# Patient Record
Sex: MALE | Race: OTHER | Employment: UNEMPLOYED | URBAN - METROPOLITAN AREA
[De-identification: names, ages, dates, MRNs, and addresses within clinical notes are randomized per-mention and may not be internally consistent; named-entity substitution may affect disease eponyms.]

---

## 2019-09-16 ENCOUNTER — HOSPITAL ENCOUNTER (INPATIENT)
Age: 54
LOS: 5 days | Discharge: HOME | DRG: 885 | End: 2019-09-22
Attending: PSYCHIATRY & NEUROLOGY | Admitting: PSYCHIATRY & NEUROLOGY
Payer: MEDICARE

## 2019-09-16 DIAGNOSIS — F25.0 SCHIZOAFFECTIVE DISORDER, BIPOLAR TYPE (HCC): Primary | ICD-10-CM

## 2019-09-16 LAB
ACETAMINOPHEN LEVEL: <5 UG/ML (ref 10–30)
ALBUMIN SERPL-MCNC: 3.9 G/DL (ref 3.5–4.6)
ALP BLD-CCNC: 143 U/L (ref 35–104)
ALT SERPL-CCNC: 16 U/L (ref 0–41)
AMPHETAMINE SCREEN, URINE: NORMAL
ANION GAP SERPL CALCULATED.3IONS-SCNC: 13 MEQ/L (ref 9–15)
AST SERPL-CCNC: 11 U/L (ref 0–40)
BARBITURATE SCREEN URINE: NORMAL
BASOPHILS ABSOLUTE: 0.2 K/UL (ref 0–0.2)
BASOPHILS RELATIVE PERCENT: 1.2 %
BENZODIAZEPINE SCREEN, URINE: NORMAL
BILIRUB SERPL-MCNC: 0.9 MG/DL (ref 0.2–0.7)
BILIRUBIN URINE: NEGATIVE
BLOOD, URINE: NEGATIVE
BUN BLDV-MCNC: 7 MG/DL (ref 6–20)
CALCIUM SERPL-MCNC: 9 MG/DL (ref 8.5–9.9)
CANNABINOID SCREEN URINE: NORMAL
CHLORIDE BLD-SCNC: 96 MEQ/L (ref 95–107)
CHOLESTEROL, TOTAL: 106 MG/DL (ref 0–199)
CLARITY: CLEAR
CO2: 26 MEQ/L (ref 20–31)
COCAINE METABOLITE SCREEN URINE: NORMAL
COLOR: YELLOW
CREAT SERPL-MCNC: 0.75 MG/DL (ref 0.7–1.2)
EKG ATRIAL RATE: 81 BPM
EKG P AXIS: 32 DEGREES
EKG P-R INTERVAL: 154 MS
EKG Q-T INTERVAL: 366 MS
EKG QRS DURATION: 86 MS
EKG QTC CALCULATION (BAZETT): 425 MS
EKG R AXIS: 26 DEGREES
EKG T AXIS: 24 DEGREES
EKG VENTRICULAR RATE: 81 BPM
EOSINOPHILS ABSOLUTE: 0.4 K/UL (ref 0–0.7)
EOSINOPHILS RELATIVE PERCENT: 2.7 %
ETHANOL PERCENT: NORMAL G/DL
ETHANOL: <10 MG/DL (ref 0–0.08)
GFR AFRICAN AMERICAN: >60
GFR NON-AFRICAN AMERICAN: >60
GLOBULIN: 4.6 G/DL (ref 2.3–3.5)
GLUCOSE BLD-MCNC: 106 MG/DL (ref 70–99)
GLUCOSE URINE: NEGATIVE MG/DL
HCT VFR BLD CALC: 47 % (ref 42–52)
HDLC SERPL-MCNC: 27 MG/DL (ref 40–59)
HEMOGLOBIN: 16.3 G/DL (ref 14–18)
KETONES, URINE: NEGATIVE MG/DL
LDL CHOLESTEROL CALCULATED: 52 MG/DL (ref 0–129)
LEUKOCYTE ESTERASE, URINE: NEGATIVE
LYMPHOCYTES ABSOLUTE: 2.5 K/UL (ref 1–4.8)
LYMPHOCYTES RELATIVE PERCENT: 18.2 %
Lab: NORMAL
MCH RBC QN AUTO: 30.4 PG (ref 27–31.3)
MCHC RBC AUTO-ENTMCNC: 34.7 % (ref 33–37)
MCV RBC AUTO: 87.6 FL (ref 80–100)
METHADONE SCREEN, URINE: NORMAL
MONOCYTES ABSOLUTE: 0.9 K/UL (ref 0.2–0.8)
MONOCYTES RELATIVE PERCENT: 6.8 %
NEUTROPHILS ABSOLUTE: 9.8 K/UL (ref 1.4–6.5)
NEUTROPHILS RELATIVE PERCENT: 71.1 %
NITRITE, URINE: NEGATIVE
OPIATE SCREEN URINE: NORMAL
OXYCODONE URINE: NORMAL
PDW BLD-RTO: 14.7 % (ref 11.5–14.5)
PH UA: 6 (ref 5–9)
PHENCYCLIDINE SCREEN URINE: NORMAL
PLATELET # BLD: 150 K/UL (ref 130–400)
POTASSIUM SERPL-SCNC: 3.5 MEQ/L (ref 3.4–4.9)
PROPOXYPHENE SCREEN: NORMAL
PROTEIN UA: NEGATIVE MG/DL
RBC # BLD: 5.36 M/UL (ref 4.7–6.1)
SALICYLATE, SERUM: <0.3 MG/DL (ref 15–30)
SODIUM BLD-SCNC: 135 MEQ/L (ref 135–144)
SPECIFIC GRAVITY UA: 1.01 (ref 1–1.03)
TOTAL CK: 63 U/L (ref 0–190)
TOTAL PROTEIN: 8.5 G/DL (ref 6.3–8)
TRIGL SERPL-MCNC: 136 MG/DL (ref 0–150)
TSH SERPL DL<=0.05 MIU/L-ACNC: 4.27 UIU/ML (ref 0.44–3.86)
URINE REFLEX TO CULTURE: NORMAL
UROBILINOGEN, URINE: 0.2 E.U./DL
WBC # BLD: 13.7 K/UL (ref 4.8–10.8)

## 2019-09-16 PROCEDURE — G0480 DRUG TEST DEF 1-7 CLASSES: HCPCS

## 2019-09-16 PROCEDURE — 80061 LIPID PANEL: CPT

## 2019-09-16 PROCEDURE — 36415 COLL VENOUS BLD VENIPUNCTURE: CPT

## 2019-09-16 PROCEDURE — 93005 ELECTROCARDIOGRAM TRACING: CPT | Performed by: PSYCHIATRY & NEUROLOGY

## 2019-09-16 PROCEDURE — 80053 COMPREHEN METABOLIC PANEL: CPT

## 2019-09-16 PROCEDURE — 82550 ASSAY OF CK (CPK): CPT

## 2019-09-16 PROCEDURE — 81003 URINALYSIS AUTO W/O SCOPE: CPT

## 2019-09-16 PROCEDURE — 85025 COMPLETE CBC W/AUTO DIFF WBC: CPT

## 2019-09-16 PROCEDURE — 99285 EMERGENCY DEPT VISIT HI MDM: CPT

## 2019-09-16 PROCEDURE — 6370000000 HC RX 637 (ALT 250 FOR IP): Performed by: NURSE PRACTITIONER

## 2019-09-16 PROCEDURE — 84443 ASSAY THYROID STIM HORMONE: CPT

## 2019-09-16 PROCEDURE — 80307 DRUG TEST PRSMV CHEM ANLYZR: CPT

## 2019-09-16 RX ORDER — NICOTINE 21 MG/24HR
1 PATCH, TRANSDERMAL 24 HOURS TRANSDERMAL ONCE
Status: DISCONTINUED | OUTPATIENT
Start: 2019-09-16 | End: 2019-09-17 | Stop reason: SDUPTHER

## 2019-09-16 RX ORDER — PAROXETINE HYDROCHLORIDE 20 MG/1
20 TABLET, FILM COATED ORAL EVERY MORNING
Status: ON HOLD | COMMUNITY
End: 2019-09-22 | Stop reason: HOSPADM

## 2019-09-16 SDOH — HEALTH STABILITY: MENTAL HEALTH: HOW OFTEN DO YOU HAVE A DRINK CONTAINING ALCOHOL?: NEVER

## 2019-09-16 ASSESSMENT — ENCOUNTER SYMPTOMS
WHEEZING: 0
ABDOMINAL PAIN: 0
COUGH: 0
SORE THROAT: 0
SHORTNESS OF BREATH: 0
BACK PAIN: 0
COLOR CHANGE: 0
ABDOMINAL DISTENTION: 0
NAUSEA: 0
TROUBLE SWALLOWING: 0
CONSTIPATION: 0
VOMITING: 0
DIARRHEA: 0
RHINORRHEA: 0
CHEST TIGHTNESS: 0

## 2019-09-17 PROCEDURE — 1240000000 HC EMOTIONAL WELLNESS R&B

## 2019-09-17 PROCEDURE — 6370000000 HC RX 637 (ALT 250 FOR IP): Performed by: PSYCHIATRY & NEUROLOGY

## 2019-09-17 PROCEDURE — 99223 1ST HOSP IP/OBS HIGH 75: CPT | Performed by: PSYCHIATRY & NEUROLOGY

## 2019-09-17 PROCEDURE — 6370000000 HC RX 637 (ALT 250 FOR IP): Performed by: NURSE PRACTITIONER

## 2019-09-17 RX ORDER — TRAZODONE HYDROCHLORIDE 50 MG/1
50 TABLET ORAL NIGHTLY PRN
Status: DISCONTINUED | OUTPATIENT
Start: 2019-09-17 | End: 2019-09-19

## 2019-09-17 RX ORDER — HYDROXYZINE HYDROCHLORIDE 50 MG/ML
50 INJECTION, SOLUTION INTRAMUSCULAR EVERY 6 HOURS PRN
Status: DISCONTINUED | OUTPATIENT
Start: 2019-09-17 | End: 2019-09-22 | Stop reason: HOSPADM

## 2019-09-17 RX ORDER — ACETAMINOPHEN 325 MG/1
650 TABLET ORAL EVERY 4 HOURS PRN
Status: DISCONTINUED | OUTPATIENT
Start: 2019-09-17 | End: 2019-09-22 | Stop reason: HOSPADM

## 2019-09-17 RX ORDER — ATORVASTATIN CALCIUM 40 MG/1
40 TABLET, FILM COATED ORAL NIGHTLY
Status: DISCONTINUED | OUTPATIENT
Start: 2019-09-17 | End: 2019-09-22 | Stop reason: HOSPADM

## 2019-09-17 RX ORDER — PROPRANOLOL HYDROCHLORIDE 10 MG/1
20 TABLET ORAL 2 TIMES DAILY
Status: DISCONTINUED | OUTPATIENT
Start: 2019-09-17 | End: 2019-09-18

## 2019-09-17 RX ORDER — HALOPERIDOL 5 MG/ML
5 INJECTION INTRAMUSCULAR EVERY 6 HOURS PRN
Status: DISCONTINUED | OUTPATIENT
Start: 2019-09-17 | End: 2019-09-22 | Stop reason: HOSPADM

## 2019-09-17 RX ORDER — HYDROXYZINE PAMOATE 50 MG/1
50 CAPSULE ORAL EVERY 6 HOURS PRN
Status: DISCONTINUED | OUTPATIENT
Start: 2019-09-17 | End: 2019-09-22 | Stop reason: HOSPADM

## 2019-09-17 RX ORDER — NICOTINE 21 MG/24HR
1 PATCH, TRANSDERMAL 24 HOURS TRANSDERMAL DAILY
Status: DISCONTINUED | OUTPATIENT
Start: 2019-09-17 | End: 2019-09-22 | Stop reason: HOSPADM

## 2019-09-17 RX ORDER — BENZTROPINE MESYLATE 1 MG/ML
2 INJECTION INTRAMUSCULAR; INTRAVENOUS 2 TIMES DAILY PRN
Status: DISCONTINUED | OUTPATIENT
Start: 2019-09-17 | End: 2019-09-22 | Stop reason: HOSPADM

## 2019-09-17 RX ORDER — RISPERIDONE 1 MG/1
1 TABLET, FILM COATED ORAL 2 TIMES DAILY
Status: DISCONTINUED | OUTPATIENT
Start: 2019-09-17 | End: 2019-09-18

## 2019-09-17 RX ORDER — HALOPERIDOL 5 MG
5 TABLET ORAL EVERY 6 HOURS PRN
Status: DISCONTINUED | OUTPATIENT
Start: 2019-09-17 | End: 2019-09-22 | Stop reason: HOSPADM

## 2019-09-17 RX ADMIN — RISPERIDONE 1 MG: 1 TABLET ORAL at 12:13

## 2019-09-17 RX ADMIN — ATORVASTATIN CALCIUM 40 MG: 40 TABLET, FILM COATED ORAL at 21:17

## 2019-09-17 RX ADMIN — HYDROXYZINE PAMOATE 50 MG: 50 CAPSULE ORAL at 09:18

## 2019-09-17 RX ADMIN — HYDROXYZINE PAMOATE 50 MG: 50 CAPSULE ORAL at 21:17

## 2019-09-17 RX ADMIN — TRAZODONE HYDROCHLORIDE 50 MG: 50 TABLET ORAL at 21:17

## 2019-09-17 RX ADMIN — HYDROXYZINE PAMOATE 50 MG: 50 CAPSULE ORAL at 01:50

## 2019-09-17 RX ADMIN — RISPERIDONE 1 MG: 1 TABLET ORAL at 21:17

## 2019-09-17 RX ADMIN — TRAZODONE HYDROCHLORIDE 50 MG: 50 TABLET ORAL at 01:50

## 2019-09-17 RX ADMIN — HALOPERIDOL 5 MG: 5 TABLET ORAL at 03:19

## 2019-09-17 ASSESSMENT — SLEEP AND FATIGUE QUESTIONNAIRES
RESTFUL SLEEP: NO
AVERAGE NUMBER OF SLEEP HOURS: 1
SLEEP PATTERN: DISTURBED/INTERRUPTED SLEEP;DIFFICULTY FALLING ASLEEP
DIFFICULTY STAYING ASLEEP: YES
DO YOU USE A SLEEP AID: NO
DIFFICULTY FALLING ASLEEP: YES
DO YOU HAVE DIFFICULTY SLEEPING: YES
DIFFICULTY ARISING: NO

## 2019-09-17 ASSESSMENT — LIFESTYLE VARIABLES: HISTORY_ALCOHOL_USE: NO

## 2019-09-17 NOTE — PROGRESS NOTES
Patient had a flat affect, was anxious but pleasant and cooperative. Patient denies feeling depressed but had thoughts of hurting himself. Patient was hearing voices telling him to harm his grand kids, other members of his family and harm himself. He denies any visual hallucinations. Patient lives alone but his son comes on weekends. He has a good support system. Poor sleep and low appetite. He denies using drugs or drinking alcohol. He attends AA Meeting, watches TV and enjoys going outside.  Electronically signed by Halie Bearden, [address] on 9/17/2019 at 1:54 PM

## 2019-09-17 NOTE — CARE COORDINATION
Brief Intervention and Referral to Treatment Summary    Patient was provided PHQ-9, AUDIT and DAST Screening:      PHQ-9 Score: 0  AUDIT Score:  0  DAST Score:  0    Patients substance use is considered     Low Risk/Healthy x  Moderate Risk  Harmful  Dependent    Patients depression is considered:     Minimal x  Mild   Moderate  Moderately Severe  Severe    Brief Education Was Provided    Patient was receptive x  Patient was not receptive      Brief Intervention Is Provided (Only for AUDIT or DAST)     Patient reports readiness to decrease and/or stop use and a plan was discussed   Patient denies readiness to decrease and/or stop use and a plan was not discussed      Recommendations/Referrals for Brief and/or Specialized Treatment Provided to Patient   Patient denies use of drugs/alcohol. Patients tox screen was negative for both. Patient wants to follow up with [facility].

## 2019-09-17 NOTE — H&P
Diagnosis Date    Hyperlipidemia     Hypertension        Past Surgical History:    History reviewed. No pertinent surgical history. Allergies:   Patient has no known allergies. Family History  Family History   Family history unknown: Yes         Social History:  Born and Raised: [place]  Describes Childhood:   supportive  Education: [employer]  Employment: Unemployed, not seeking work  Relationships: single  Children: 3  Current Support: children    Legal Hx: none  Access to weapons?:  No      EXAMINATION:    REVIEW OF SYSTEMS:    ROS:  [x] All negative/unchanged except if checked.  Explain positive(checked items) below:  [] Constitutional  [] Eyes  [] Ear/Nose/Mouth/Throat  [] Respiratory  [] CV  [] GI  []   [] Musculoskeletal  [] Skin/Breast  [] Neurological  [] Endocrine  [] Heme/Lymph  [] Allergic/Immunologic    Explanation:     Vitals:  BP (!) 147/95 Comment: RN notified  Pulse 112   Temp 98 °F (36.7 °C) (Oral)   Resp 20   Ht 5' 8\" (1.727 m)   Wt 175 lb (79.4 kg)   SpO2 98%   BMI 26.61 kg/m²      Neurologic Exam:   Muscle Strength & Tone: full ROM  Gait: normal gait   Involuntary Movements: No    Mental Status Examination:    Level of consciousness:  within normal limits   Appearance:  ill-appearing  Behavior/Motor:  psychomotor retardation  Attitude toward examiner:  cooperative  Speech:  slow   Mood: dysthymic  Affect:  mood congruent  Thought processes:  linear   Thought content:  Delusions:  paranoid  Perceptual Disturbance:  auditory  Cognition:  oriented to person, place, and time   Concentration poor  Memory intact  Insight poor   Judgement poor   Fund of Knowledge limited    Mini Mental Status 30/30      DIAGNOSIS:     Schizoaffective disorder depressive type    High risk of self harm and harm to others - sec to      LABS: REVIEWED TODAY:  Recent Labs     09/16/19 2052   WBC 13.7*   HGB 16.3        Recent Labs     09/16/19 2052      K 3.5   CL 96   CO2 26   BUN 7

## 2019-09-17 NOTE — ED NOTES
Provisional Diagnosis:    Schizophrenia, bipolar type    Psychosocial and Contextual Factors:    Patient is a 49year old male that lives at home with his son and grandson. He is an active patient of Laura Re[facility], last visit one month prior. No past or current legal issues. C-SSRS Summary:     Patient: C-SSRS Suicide Screening  1) Within the past month, have you wished you were dead or wished you could go to sleep and not wake up? : No  2) Have you actually had any thoughts of killing yourself? : No  6) Have you ever done anything, started to do anything, or prepared to do anything to end your life?: Yes  Did this occur within the past 3 months? : No    Family: None present    Agency: [facility]       Abuse Assessment  Physical Abuse: Denies  Verbal Abuse: Denies  Emotional Abuse: Denies  Financial Abuse: Denies  Sexual Abuse: Denies  Elder abuse: No    Clinical Summary:    Patient presents to the ER on a voluntary basis for command hallucinations x 1 month that have progressively worsened. He denies SI/HI/visual hallucinations. He states the voices tell him to harm himself, however he is not suicidal. He has a past history of one self aborted suicide attempt in which he held a gun to his head. Patient states he does not feel safe at home with the command hallucinations \"getting stronger\" with his grandson at home. He contracts for safety while in the hospital. He reports being off of his risperdone for an unknown amount of time- greater then one month. Level of Care Disposition:      Per Dr Margaret Solano.  Iris Laura RN  09/17/19 3141

## 2019-09-17 NOTE — ED PROVIDER NOTES
[address] [facility]  EMERGENCY DEPARTMENT ENCOUNTER      Pt Name: Renée Weaver  MRN: [id number]  Castillogfjeaneth 1965  Date of evaluation: 9/16/2019  Provider: NATE Manrique [id number]       Chief Complaint   Patient presents with    Psychiatric Evaluation     audible hallucinations         HISTORY OF PRESENT ILLNESS   (Location/Symptom, Timing/Onset,Context/Setting, Quality, Duration, Modifying Factors, Severity)  Note limiting factors. Renée Weaver is a 49 y.o. male who presents to the emergency department for complaint of auditory hallucinations worsening over the past few weeks. Patient states that approximate 1 month ago he began having a recurrence of his previous auditory hallucinations. He states generally the sounds he hears are mobile voices that are hard to distinguish. He states that he was placed on new medication by his therapist and that he felt as if the symptoms are getting worse. He states that after this he began to decrease and then finally completely stop all the psych medications. He states he still takes his medication for cholesterol but he has not been taking any of his other behavioral health type meds. He states over the past 3 weeks he has had increased worsening of the auditory hallucinations. He states that they are now constant and he can hear voices clearly having conversations and giving him commands. He states over the past few days the voices have clearly been telling him to harm his family including his grandchildren and given him detailed instructions on how to do so. He states that this is causing him to have a decrease in his anxiety and he is now thinking that he should harm himself before he does anything to hurt anyone else. He denies any active suicidal ideas or active suicidal plans. He states he is feeling this way only because he is afraid he would cause harm to his family because the voices are time to do so.   He denies any normal and breath sounds normal.   Abdominal: Soft. He exhibits no distension. There is no tenderness. There is no guarding. Musculoskeletal: Normal range of motion. He exhibits no tenderness. Neurological: He is alert and oriented to person, place, and time. No cranial nerve deficit. Coordination normal.   Skin: Skin is warm and dry. Capillary refill takes less than 2 seconds. He is not diaphoretic. Psychiatric: His speech is normal. His affect is blunt. He is withdrawn and actively hallucinating. Thought content is paranoid and delusional. Cognition and memory are normal. He expresses impulsivity and inappropriate judgment. He expresses homicidal and suicidal ideation. He expresses suicidal plans and homicidal plans. He is attentive.        RESULTS     EKG: All EKG's are interpreted by the Emergency Department Physician who either signs or Co-signsthis chart in the absence of a cardiologist.        RADIOLOGY:   Yuvonne Bride such as CT, Ultrasound and MRI are read by the radiologist. Farida Blackwell radiographic images are visualized and preliminarily interpreted by the emergency physician with the below findings:        Interpretation per the Radiologist below, if available at the time ofthis note:    No orders to display         ED BEDSIDE ULTRASOUND:   Performed by ED Physician - none    LABS:  Labs Reviewed   ACETAMINOPHEN LEVEL - Abnormal; Notable for the following components:       Result Value    Acetaminophen Level <5 (*)     All other components within normal limits   CBC WITH AUTO DIFFERENTIAL - Abnormal; Notable for the following components:    WBC 13.7 (*)     RDW 14.7 (*)     Neutrophils Absolute 9.8 (*)     Monocytes Absolute 0.9 (*)     All other components within normal limits   COMPREHENSIVE METABOLIC PANEL - Abnormal; Notable for the following components:    Glucose 106 (*)     Total Protein 8.5 (*)     Total Bilirubin 0.9 (*)     Alkaline Phosphatase 143 (*)     Globulin 4.6 (*)     All other

## 2019-09-17 NOTE — PROGRESS NOTES
Pt. declined to attend the 0900 community meeting, despite staff encouragement. Electronically signed by Tawanda Shin [address] on 9/17/2019 at 9:49 AM

## 2019-09-17 NOTE — ED TRIAGE NOTES
Pt is PRECIOUS pt states audible hallucinations. Being woke up, not sleeping. Pt denies being suicidal at this time but is concerned because young grandson at home and feels nervous for his safety. b  Pt calm and cooperative. [facility] recently changed Paxil to control hallucinations. Pt states symptoms are worse, stopped taking Paxil 3 weeks ago.

## 2019-09-18 PROCEDURE — 6370000000 HC RX 637 (ALT 250 FOR IP): Performed by: HOSPITALIST

## 2019-09-18 PROCEDURE — 1240000000 HC EMOTIONAL WELLNESS R&B

## 2019-09-18 PROCEDURE — 93010 ELECTROCARDIOGRAM REPORT: CPT | Performed by: INTERNAL MEDICINE

## 2019-09-18 PROCEDURE — 6370000000 HC RX 637 (ALT 250 FOR IP): Performed by: NURSE PRACTITIONER

## 2019-09-18 PROCEDURE — 6370000000 HC RX 637 (ALT 250 FOR IP): Performed by: PSYCHIATRY & NEUROLOGY

## 2019-09-18 PROCEDURE — 99232 SBSQ HOSP IP/OBS MODERATE 35: CPT | Performed by: PSYCHIATRY & NEUROLOGY

## 2019-09-18 RX ORDER — RISPERIDONE 1 MG/1
1 TABLET, FILM COATED ORAL EVERY MORNING
Status: DISCONTINUED | OUTPATIENT
Start: 2019-09-19 | End: 2019-09-22 | Stop reason: HOSPADM

## 2019-09-18 RX ORDER — PROPRANOLOL HYDROCHLORIDE 20 MG/1
20 TABLET ORAL 3 TIMES DAILY
Status: DISCONTINUED | OUTPATIENT
Start: 2019-09-18 | End: 2019-09-22 | Stop reason: HOSPADM

## 2019-09-18 RX ORDER — RISPERIDONE 2 MG/1
2 TABLET, FILM COATED ORAL NIGHTLY
Status: DISCONTINUED | OUTPATIENT
Start: 2019-09-18 | End: 2019-09-22 | Stop reason: HOSPADM

## 2019-09-18 RX ADMIN — ATORVASTATIN CALCIUM 40 MG: 40 TABLET, FILM COATED ORAL at 20:46

## 2019-09-18 RX ADMIN — RISPERIDONE 2 MG: 2 TABLET ORAL at 20:46

## 2019-09-18 RX ADMIN — PROPRANOLOL HYDROCHLORIDE 20 MG: 20 TABLET ORAL at 00:40

## 2019-09-18 RX ADMIN — HYDROXYZINE PAMOATE 50 MG: 50 CAPSULE ORAL at 20:47

## 2019-09-18 RX ADMIN — TRAZODONE HYDROCHLORIDE 50 MG: 50 TABLET ORAL at 20:46

## 2019-09-18 RX ADMIN — RISPERIDONE 1 MG: 1 TABLET ORAL at 09:01

## 2019-09-18 RX ADMIN — PROPRANOLOL HYDROCHLORIDE 20 MG: 20 TABLET ORAL at 10:08

## 2019-09-18 RX ADMIN — PROPRANOLOL HYDROCHLORIDE 20 MG: 20 TABLET ORAL at 20:46

## 2019-09-18 NOTE — PROGRESS NOTES
mg, Oral, BID, Makenzie Avila MD, 1 mg at 09/18/19 0901      Examination:  BP (!) 120/91 Comment: RN notified  Pulse 83   Temp 97 °F (36.1 °C) (Oral)   Resp 20   Ht 5' 8\" (1.727 m)   Wt 175 lb (79.4 kg)   SpO2 94%   BMI 26.61 kg/m²   Gait - steady  Medication side effects(SE): no    Mental Status Examination:    Level of consciousness:  within normal limits   Appearance:  poor grooming and poor hygiene  Behavior/Motor:  psychomotor retardation  Attitude toward examiner:  withdrawn  Speech:  slow   Mood: dysthymic  Affect:  mood congruent  Thought processes:  slow   Thought content:  Delusions:  ideas of reference  Perceptual Disturbance:  auditory  Cognition:  oriented to person, place, and time   Concentration poor  Insight poor   Judgement poor     ASSESSMENT:   Patient symptoms are:  [] Well controlled  [] Improving  [] Worsening  [] No change      Diagnosis:   Principal Problem:    [facility]  Resolved Problems:    * No resolved hospital problems. *      LABS:    Recent Labs     09/16/19 2052   WBC 13.7*   HGB 16.3        Recent Labs     09/16/19 2052      K 3.5   CL 96   CO2 26   BUN 7   CREATININE 0.75   GLUCOSE 106*     Recent Labs     09/16/19 2052   BILITOT 0.9*   ALKPHOS 143*   AST 11   ALT 16     Lab Results   Component Value Date    LABAMPH Neg 09/16/2019    BARBSCNU Neg 09/16/2019    LABBENZ Neg 09/16/2019    LABMETH Neg 09/16/2019    OPIATESCREENURINE Neg 09/16/2019    PHENCYCLIDINESCREENURINE Neg 09/16/2019    ETOH <10 09/16/2019     Lab Results   Component Value Date    TSH 4.270 09/16/2019     No results found for: LITHIUM  No results found for: VALPROATE, CBMZ    RISK ASSESSMENT: still psychotic    Treatment Plan:  Reviewed current Medications with the patient. Increase risperdal as ordered  Risks, benefits, side effects, drug-to-drug interactions and alternatives to treatment were discussed.   Collateral information: pending  CD evaluation  Encourage patient to attend group and other milieu activities.   Discharge planning discussed with the patient and treatment team.    PSYCHOTHERAPY/COUNSELING:  [x] Therapeutic interview  [x] Supportive  [] CBT  [] Ongoing  [] Other    [x] Patient continues to need, on a daily basis, active treatment furnished directly by or requiring the supervision of inpatient psychiatric personnel      Anticipated Length of stay:            Electronically signed by Krystal Rene MD on 9/18/2019 at 10:44 AM

## 2019-09-18 NOTE — PROGRESS NOTES
Pt. declined to attend the 0900 community meeting, despite staff encouragement.  Electronically signed by Johanna Raya on 9/18/2019 at 9:37 AM

## 2019-09-18 NOTE — GROUP NOTE
Group Therapy Note    Date: September 18    Group Start Time: 1000  Group End Time: 1100  Group Topic: Psychoeducation    [facility]    Qian Horne, CTRS        Group Therapy Note    Attendees: 11         Patient's Goal:  \"to feel better\"    Notes:  Pt. attended the 1000 skill group. Pt. was calm and relaxed. Worked on project with interest.     Status After Intervention:  Improved    Participation Level:  Active Listener and Interactive    Participation Quality: Appropriate, Attentive and Sharing      Speech:  normal      Thought Process/Content: Logical      Affective Functioning: Congruent      Mood: calm      Level of consciousness:  Alert, Oriented x4 and Attentive      Response to Learning: Progressing to goal      Endings: None Reported    Modes of Intervention: Education, Support, Socialization and Activity      Discipline Responsible: Psychoeducational Specialist      Signature:  Olivia Brown

## 2019-09-19 PROCEDURE — 6370000000 HC RX 637 (ALT 250 FOR IP): Performed by: HOSPITALIST

## 2019-09-19 PROCEDURE — 99232 SBSQ HOSP IP/OBS MODERATE 35: CPT | Performed by: PSYCHIATRY & NEUROLOGY

## 2019-09-19 PROCEDURE — 6370000000 HC RX 637 (ALT 250 FOR IP): Performed by: PSYCHIATRY & NEUROLOGY

## 2019-09-19 PROCEDURE — 6370000000 HC RX 637 (ALT 250 FOR IP): Performed by: NURSE PRACTITIONER

## 2019-09-19 PROCEDURE — 1240000000 HC EMOTIONAL WELLNESS R&B

## 2019-09-19 RX ORDER — TRAZODONE HYDROCHLORIDE 100 MG/1
100 TABLET ORAL NIGHTLY
Status: DISCONTINUED | OUTPATIENT
Start: 2019-09-19 | End: 2019-09-20

## 2019-09-19 RX ADMIN — ATORVASTATIN CALCIUM 40 MG: 40 TABLET, FILM COATED ORAL at 20:16

## 2019-09-19 RX ADMIN — RISPERIDONE 1 MG: 1 TABLET, FILM COATED ORAL at 10:11

## 2019-09-19 RX ADMIN — PROPRANOLOL HYDROCHLORIDE 20 MG: 20 TABLET ORAL at 20:16

## 2019-09-19 RX ADMIN — PROPRANOLOL HYDROCHLORIDE 20 MG: 20 TABLET ORAL at 14:26

## 2019-09-19 RX ADMIN — RISPERIDONE 2 MG: 2 TABLET ORAL at 20:16

## 2019-09-19 RX ADMIN — TRAZODONE HYDROCHLORIDE 100 MG: 100 TABLET ORAL at 21:34

## 2019-09-19 RX ADMIN — PROPRANOLOL HYDROCHLORIDE 20 MG: 20 TABLET ORAL at 10:11

## 2019-09-19 RX ADMIN — HYDROXYZINE PAMOATE 50 MG: 50 CAPSULE ORAL at 21:34

## 2019-09-19 RX ADMIN — HYDROXYZINE PAMOATE 50 MG: 50 CAPSULE ORAL at 12:57

## 2019-09-19 NOTE — GROUP NOTE
Group Therapy Note    Date: September 19    Group Start Time: 1100  Group End Time: 1200  Group Topic: Psychotherapy    [facility] I    Luna Zhao        Group Therapy Note    Attendees: 14         Patient's Goal:  To participate in group therapy process    Notes:  Patient reported hearing voices and a need to reevaluate medications    Status After Intervention:  Improved    Participation Level: Interactive    Participation Quality: Appropriate      Speech:  normal      Thought Process/Content: Logical      Affective Functioning: Congruent      Mood: euthymic      Level of consciousness:  Alert and Oriented x4      Response to Learning: Able to verbalize current knowledge/experience      Endings: None Reported    Modes of Intervention: Support      Discipline Responsible: /Counselor      Signature:  Luna Zhao

## 2019-09-19 NOTE — PROGRESS NOTES
at 09/19/19 1011    atorvastatin (LIPITOR) tablet 40 mg, 40 mg, Oral, Nightly, NATE Ma CNP, 40 mg at 09/18/19 2046      Examination:  /89   Pulse 94   Temp 98 °F (36.7 °C)   Resp 18   Ht 5' 8\" (1.727 m)   Wt 175 lb (79.4 kg)   SpO2 93%   BMI 26.61 kg/m²   Gait - steady  Medication side effects(SE): no    Mental Status Examination:    Level of consciousness:  within normal limits   Appearance:  poor grooming and poor hygiene  Behavior/Motor:  psychomotor retardation  Attitude toward examiner:  withdrawn  Speech:  slow   Mood: dysthymic  Affect:  mood congruent  Thought processes:  slow   Thought content:  Delusions:  ideas of reference  Perceptual Disturbance:  Less intense auditory  Cognition:  oriented to person, place, and time   Concentration better  Insight poor   Judgement poor     ASSESSMENT:   Patient symptoms are:  [] Well controlled  [] Improving  [] Worsening  [] No change      Diagnosis:   Principal Problem:    Schizoaffective disorder, bipolar type ([facility]  Resolved Problems:    * No resolved hospital problems. *      LABS:    Recent Labs     09/16/19 2052   WBC 13.7*   HGB 16.3        Recent Labs     09/16/19 2052      K 3.5   CL 96   CO2 26   BUN 7   CREATININE 0.75   GLUCOSE 106*     Recent Labs     09/16/19 2052   BILITOT 0.9*   ALKPHOS 143*   AST 11   ALT 16     Lab Results   Component Value Date    LABAMPH Neg 09/16/2019    BARBSCNU Neg 09/16/2019    LABBENZ Neg 09/16/2019    LABMETH Neg 09/16/2019    OPIATESCREENURINE Neg 09/16/2019    PHENCYCLIDINESCREENURINE Neg 09/16/2019    ETOH <10 09/16/2019     Lab Results   Component Value Date    TSH 4.270 09/16/2019     No results found for: LITHIUM  No results found for: VALPROATE, CBMZ    RISK ASSESSMENT: still psychotic    Treatment Plan:  Reviewed current Medications with the patient.    Increase risperdal as ordered  Add trazodone 100 mg po qhs  Risks, benefits, side effects, drug-to-drug interactions and alternatives to treatment were discussed. Collateral information: pending  CD evaluation  Encourage patient to attend group and other milieu activities.   Discharge planning discussed with the patient and treatment team.    PSYCHOTHERAPY/COUNSELING:  [x] Therapeutic interview  [x] Supportive  [] CBT  [] Ongoing  [] Other    [x] Patient continues to need, on a daily basis, active treatment furnished directly by or requiring the supervision of inpatient psychiatric personnel      Anticipated Length of stay:            Electronically signed by Jose Ruvalcaba MD on 9/19/2019 at 10:39 AM

## 2019-09-20 PROCEDURE — 90833 PSYTX W PT W E/M 30 MIN: CPT | Performed by: PSYCHIATRY & NEUROLOGY

## 2019-09-20 PROCEDURE — 6370000000 HC RX 637 (ALT 250 FOR IP): Performed by: PSYCHIATRY & NEUROLOGY

## 2019-09-20 PROCEDURE — 6370000000 HC RX 637 (ALT 250 FOR IP): Performed by: HOSPITALIST

## 2019-09-20 PROCEDURE — 99232 SBSQ HOSP IP/OBS MODERATE 35: CPT | Performed by: PSYCHIATRY & NEUROLOGY

## 2019-09-20 PROCEDURE — 6370000000 HC RX 637 (ALT 250 FOR IP): Performed by: NURSE PRACTITIONER

## 2019-09-20 PROCEDURE — 1240000000 HC EMOTIONAL WELLNESS R&B

## 2019-09-20 RX ORDER — TRAZODONE HYDROCHLORIDE 150 MG/1
150 TABLET ORAL NIGHTLY
Status: DISCONTINUED | OUTPATIENT
Start: 2019-09-20 | End: 2019-09-22 | Stop reason: HOSPADM

## 2019-09-20 RX ADMIN — TRAZODONE HYDROCHLORIDE 150 MG: 150 TABLET ORAL at 21:23

## 2019-09-20 RX ADMIN — HYDROXYZINE PAMOATE 50 MG: 50 CAPSULE ORAL at 11:26

## 2019-09-20 RX ADMIN — PROPRANOLOL HYDROCHLORIDE 20 MG: 20 TABLET ORAL at 21:23

## 2019-09-20 RX ADMIN — PROPRANOLOL HYDROCHLORIDE 20 MG: 20 TABLET ORAL at 08:41

## 2019-09-20 RX ADMIN — PROPRANOLOL HYDROCHLORIDE 20 MG: 20 TABLET ORAL at 14:48

## 2019-09-20 RX ADMIN — ATORVASTATIN CALCIUM 40 MG: 40 TABLET, FILM COATED ORAL at 21:23

## 2019-09-20 RX ADMIN — RISPERIDONE 2 MG: 2 TABLET ORAL at 21:23

## 2019-09-20 RX ADMIN — RISPERIDONE 1 MG: 1 TABLET, FILM COATED ORAL at 08:41

## 2019-09-20 NOTE — GROUP NOTE
Group Therapy Note    Date: September 19    Group Start Time: 2100  Group End Time: 2130  Group Topic: Wrap-Up    [facility] I    Svetlana Cobb        Group Therapy Note    Attendees: 14/18    Patient attended evening wrap-Up group and participated.      Signature:  Svetlana Cobb

## 2019-09-20 NOTE — PROGRESS NOTES
BEHAVIORAL HEALTH FOLLOW-UP NOTE     9/20/2019     Patient was seen and examined in person, Chart reviewed   Patient's case discussed with staff/team    Chief Complaint: AH    Interim History:     Pt did not sleep last night until 4 AM  Pt is feeling less depressed  No AH or VH  Denies any paranoid thoughts  Looking forward to going home over the weekend  Appetite:   [] Normal/Unchanged  [] Increased  [x] Decreased      Sleep:       [] Normal/Unchanged  [x] Fair       [] Poor              Energy:    [] Normal/Unchanged  [] Increased  [x] Decreased        SI [] Present  [x] Absent    HI  []Present  [x] Absent     Aggression:  [] yes  [x] no    Patient is [] able  [x] unable to CONTRACT FOR SAFETY     PAST MEDICAL/PSYCHIATRIC HISTORY:   Past Medical History:   Diagnosis Date    Hyperlipidemia     Hypertension        FAMILY/SOCIAL HISTORY:  Family History   Family history unknown: Yes     Social History     Socioeconomic History    Marital status: Single     Spouse name: Not on file    Number of children: Not on file    Years of education: Not on file    Highest education level: Not on file   Occupational History    Not on file   Social Needs    Financial resource strain: Not on file    Food insecurity:     Worry: Not on file     Inability: Not on file    Transportation needs:     Medical: Not on file     Non-medical: Not on file   Tobacco Use    Smoking status: Current Every Day Smoker     Packs/day: 1.00     Years: 23.00     Pack years: 23.00    Smokeless tobacco: Never Used   Substance and Sexual Activity    Alcohol use: Not Currently     Frequency: Never    Drug use: Not Currently    Sexual activity: Not on file   Lifestyle    Physical activity:     Days per week: Not on file     Minutes per session: Not on file    Stress: Not on file   Relationships    Social connections:     Talks on phone: Not on file     Gets together: Not on file     Attends [employer] service: Not on file     Active member

## 2019-09-21 PROCEDURE — 6370000000 HC RX 637 (ALT 250 FOR IP): Performed by: HOSPITALIST

## 2019-09-21 PROCEDURE — 6370000000 HC RX 637 (ALT 250 FOR IP): Performed by: PSYCHIATRY & NEUROLOGY

## 2019-09-21 PROCEDURE — 6370000000 HC RX 637 (ALT 250 FOR IP): Performed by: NURSE PRACTITIONER

## 2019-09-21 PROCEDURE — 1240000000 HC EMOTIONAL WELLNESS R&B

## 2019-09-21 RX ADMIN — RISPERIDONE 1 MG: 1 TABLET, FILM COATED ORAL at 10:19

## 2019-09-21 RX ADMIN — PROPRANOLOL HYDROCHLORIDE 20 MG: 20 TABLET ORAL at 21:39

## 2019-09-21 RX ADMIN — TRAZODONE HYDROCHLORIDE 150 MG: 150 TABLET ORAL at 21:39

## 2019-09-21 RX ADMIN — PROPRANOLOL HYDROCHLORIDE 20 MG: 20 TABLET ORAL at 15:01

## 2019-09-21 RX ADMIN — ATORVASTATIN CALCIUM 40 MG: 40 TABLET, FILM COATED ORAL at 21:38

## 2019-09-21 RX ADMIN — PROPRANOLOL HYDROCHLORIDE 20 MG: 20 TABLET ORAL at 09:20

## 2019-09-21 RX ADMIN — RISPERIDONE 2 MG: 2 TABLET ORAL at 21:39

## 2019-09-21 NOTE — CARE COORDINATION
Group Therapy Note    Date: 9/21/2019  Start Time: 1100  End Time:  1200  Number of Participants: 11    Type of Group: Psychotherapy    Wellness Binder Information  Module Name:  na  Session Number:  na    Patient's Goal:  To share and participate in group. Notes:   Patient appropriately participated in group discussion regarding barriers to recovery. Patient shared personal assets that are internal to support remaining healthy after D/C. Patient supportive of other members of the group and listened  Appropriately. Status After Intervention:  Improved    Participation Level:  Active Listener and Interactive    Participation Quality: Appropriate, Attentive, Sharing and Supportive      Speech:  normal      Thought Process/Content: Logical      Affective Functioning: Congruent      Mood: euthymic      Level of consciousness:  Alert, Oriented x4 and Attentive      Response to Learning: Able to verbalize current knowledge/experience, Able to verbalize/acknowledge new learning, Able to retain information, Capable of insight and Progressing to goal      Endings: None Reported    Modes of Intervention: Support      Discipline Responsible: /Counselor    Electronically signed by LUIS EDUARDO Carias LSW on 9/21/2019 at 1:54 PM    Signature:  LUIS EDUARDO Carias LSW

## 2019-09-21 NOTE — PROGRESS NOTES
mg, 50 mg, Oral, Q6H PRN, 50 mg at 09/20/19 1126 **OR** hydrOXYzine (VISTARIL) injection 50 mg, 50 mg, Intramuscular, Q6H PRN, Courtenay Lefort, MD    haloperidol lactate (HALDOL) injection 5 mg, 5 mg, Intramuscular, Q6H PRN **OR** haloperidol (HALDOL) tablet 5 mg, 5 mg, Oral, Q6H PRN, Courtenay Lefort, MD, 5 mg at 09/17/19 0319    nicotine (NICODERM CQ) 21 MG/24HR 1 patch, 1 patch, Transdermal, Daily, Carlie Edwards MD, 1 patch at 09/21/19 1019    atorvastatin (LIPITOR) tablet 40 mg, 40 mg, Oral, Nightly, NATE Ma CNP, 40 mg at 09/20/19 2123      Examination:  /71   Pulse 84   Temp 98 °F (36.7 °C) (Oral)   Resp 20   Ht 5' 8\" (1.727 m)   Wt 175 lb (79.4 kg)   SpO2 99%   BMI 26.61 kg/m²   Gait - steady  Medication side effects(SE): no    Mental Status Examination:    Level of consciousness:  Alert, oriented  Appearance:  Improved grooming  Behavior/Motor:  no psychomotor agitation or retardation  Attitude toward examiner: seems cooperative, pleasant  Speech: With normal rate, rhythm, prosody   Mood: stated to be improved  Affect:  mood congruent, brighter  Thought processes:  Organized, goal directed  Thought content:  Delusions:  Denies paranoia or other delusions  Perceptual Disturbance:  Denies hallucinations or illusions  Cognition:  oriented to person, place, and time   Concentration improved  Insight improved  Judgement improved    ASSESSMENT:   Patient symptoms are:  [] Well controlled  [x] Improving  [] Worsening  [] No change      Diagnosis:   Principal Problem:    Schizoaffective disorder, bipolar type ([facility]  Resolved Problems:    * No resolved hospital problems. *      LABS:    No results for input(s): WBC, HGB, PLT in the last 72 hours. No results for input(s): NA, K, CL, CO2, BUN, CREATININE, GLUCOSE in the last 72 hours. No results for input(s): BILITOT, ALKPHOS, AST, ALT in the last 72 hours.   Lab Results   Component Value Date    LABAMPH Neg 09/16/2019    INDIA Neg

## 2019-09-22 VITALS
SYSTOLIC BLOOD PRESSURE: 121 MMHG | TEMPERATURE: 97 F | BODY MASS INDEX: 26.52 KG/M2 | HEIGHT: 68 IN | OXYGEN SATURATION: 98 % | DIASTOLIC BLOOD PRESSURE: 80 MMHG | RESPIRATION RATE: 20 BRPM | WEIGHT: 175 LBS | HEART RATE: 94 BPM

## 2019-09-22 PROCEDURE — 6370000000 HC RX 637 (ALT 250 FOR IP): Performed by: HOSPITALIST

## 2019-09-22 PROCEDURE — 6370000000 HC RX 637 (ALT 250 FOR IP): Performed by: PSYCHIATRY & NEUROLOGY

## 2019-09-22 RX ORDER — PROPRANOLOL HYDROCHLORIDE 20 MG/1
20 TABLET ORAL 3 TIMES DAILY
Qty: 90 TABLET | Refills: 0 | Status: SHIPPED | OUTPATIENT
Start: 2019-09-22

## 2019-09-22 RX ORDER — RISPERIDONE 1 MG/1
1 TABLET, FILM COATED ORAL EVERY MORNING
Qty: 15 TABLET | Refills: 0 | Status: SHIPPED | OUTPATIENT
Start: 2019-09-23

## 2019-09-22 RX ORDER — RISPERIDONE 2 MG/1
2 TABLET, FILM COATED ORAL NIGHTLY
Qty: 15 TABLET | Refills: 0 | Status: SHIPPED | OUTPATIENT
Start: 2019-09-22

## 2019-09-22 RX ORDER — ATORVASTATIN CALCIUM 40 MG/1
40 TABLET, FILM COATED ORAL NIGHTLY
Qty: 30 TABLET | Refills: 0 | Status: SHIPPED | OUTPATIENT
Start: 2019-09-22

## 2019-09-22 RX ADMIN — PROPRANOLOL HYDROCHLORIDE 20 MG: 20 TABLET ORAL at 13:41

## 2019-09-22 RX ADMIN — RISPERIDONE 1 MG: 1 TABLET, FILM COATED ORAL at 08:41

## 2019-09-22 RX ADMIN — PROPRANOLOL HYDROCHLORIDE 20 MG: 20 TABLET ORAL at 08:41

## 2019-09-22 NOTE — PROGRESS NOTES
Bairon Pinto MD    hydrOXYzine (VISTARIL) capsule 50 mg, 50 mg, Oral, Q6H PRN, 50 mg at 09/20/19 1126 **OR** hydrOXYzine (VISTARIL) injection 50 mg, 50 mg, Intramuscular, Q6H PRN, Shelley Jarquin MD    haloperidol lactate (HALDOL) injection 5 mg, 5 mg, Intramuscular, Q6H PRN **OR** haloperidol (HALDOL) tablet 5 mg, 5 mg, Oral, Q6H PRN, Shelley Jarquin MD, 5 mg at 09/17/19 0319    nicotine (NICODERM CQ) 21 MG/24HR 1 patch, 1 patch, Transdermal, Daily, Freddy Mills MD, 1 patch at 09/22/19 0841    atorvastatin (LIPITOR) tablet 40 mg, 40 mg, Oral, Nightly, NATE Perez - CNP, 40 mg at 09/21/19 2138      Examination:  /80   Pulse 94   Temp 97 °F (36.1 °C) (Oral)   Resp 20   Ht 5' 8\" (1.727 m)   Wt 175 lb (79.4 kg)   SpO2 98%   BMI 26.61 kg/m²   Gait - steady  Medication side effects(SE): no    Mental Status Examination:    Level of consciousness:  Alert, oriented  Appearance:  Improved grooming  Behavior/Motor:  no psychomotor agitation or retardation  Attitude toward examiner: cooperative, pleasant  Speech: With normal rate, rhythm, prosody   Mood: stated to be improved  Affect:  mood congruent, brighter  Thought processes:  Organized, goal directed  Thought content:  Delusions:  Denies paranoia or other delusions  Perceptual Disturbance:  Denies hallucinations or illusions  Cognition:  oriented to person, place, and time   Concentration improved  Insight improved  Judgement improved    ASSESSMENT:   Patient symptoms are:  [] Well controlled  [x] Improving  [] Worsening  [] No change      Diagnosis:   Principal Problem:    Schizoaffective disorder, bipolar type ([facility]  Resolved Problems:    * No resolved hospital problems. *      LABS:    No results for input(s): WBC, HGB, PLT in the last 72 hours. No results for input(s): NA, K, CL, CO2, BUN, CREATININE, GLUCOSE in the last 72 hours. No results for input(s): BILITOT, ALKPHOS, AST, ALT in the last 72 hours.   Lab Results   Component Value Date [facility] Neg 09/16/2019    BARBSCNU Neg 09/16/2019    LABBENZ Neg 09/16/2019    LABMETH Neg 09/16/2019    OPIATESCREENURINE Neg 09/16/2019    PHENCYCLIDINESCREENURINE Neg 09/16/2019    ETOH <10 09/16/2019     Lab Results   Component Value Date    TSH 4.270 09/16/2019     No results found for: LITHIUM  No results found for: VALPROATE, CBMZ    RISK ASSESSMENT: psychosis seems to be remitting    Treatment Plan:  Reviewed current Medications with the patient. Will continue current medications  Risks, benefits, side effects, drug-to-drug interactions and alternatives to treatment were discussed. CD evaluation  Encourage patient to attend group and other milieu activities. Discharge planning discussed with the patient and treatment team.    PSYCHOTHERAPY/COUNSELING:  [x] Therapeutic interview  [x] Supportive  [] CBT  [] Ongoing  [] Other       [x] Patient continues to need, on a daily basis, active treatment furnished directly by or requiring the supervision of inpatient psychiatric personnel      Anticipated Length of stay: Collateral information reviewed. The patient seems improved, and his son believes he is improved as well, based on their interactions. Patient was discharged home, with follow up. Patient was educated that if choosing to use drugs and/ or alcohol, there is a high potential for acting out impulsively, resulting in serious harm to self or others, even though unintentional. Also, patient was educated that mental health treatment may not be optimized with ongoing use of drugs or alcohol. Patient demonstrated understanding, and has the capacity to understand what was explained.               Electronically signed by Suzanne Cobos MD on 9/22/2019 at 1:24 PM

## 2019-09-22 NOTE — PROGRESS NOTES
Pt. refused to attend the 1000 skills group, despite staff encouragement. Electronically signed by Mario Choe [address] on 9/22/2019 at 11:35 AM

## 2019-09-25 NOTE — DISCHARGE SUMMARY
to person, place, and time   Concentration improved  Insight improved  Judgement improved    LABS:   Admission on 09/16/2019, Discharged on 09/22/2019   Component Date Value Ref Range Status    Acetaminophen Level 09/16/2019 <5* 10 - 30 ug/mL Final    WBC 09/16/2019 13.7* 4.8 - 10.8 K/uL Final    RBC 09/16/2019 5.36  4.70 - 6.10 M/uL Final    Hemoglobin 09/16/2019 16.3  14.0 - 18.0 g/dL Final    Hematocrit 09/16/2019 47.0  42.0 - 52.0 % Final    MCV 09/16/2019 87.6  80.0 - 100.0 fL Final    MCH 09/16/2019 30.4  27.0 - 31.3 pg Final    MCHC 09/16/2019 34.7  33.0 - 37.0 % Final    RDW 09/16/2019 14.7* 11.5 - 14.5 % Final    Platelets 37/03/2658 150  130 - 400 K/uL Final    Neutrophils % 09/16/2019 71.1  % Final    Lymphocytes % 09/16/2019 18.2  % Final    Monocytes % 09/16/2019 6.8  % Final    Eosinophils % 09/16/2019 2.7  % Final    Basophils % 09/16/2019 1.2  % Final    Neutrophils Absolute 09/16/2019 9.8* 1.4 - 6.5 K/uL Final    Lymphocytes Absolute 09/16/2019 2.5  1.0 - 4.8 K/uL Final    Monocytes Absolute 09/16/2019 0.9* 0.2 - 0.8 K/uL Final    Eosinophils Absolute 09/16/2019 0.4  0.0 - 0.7 K/uL Final    Basophils Absolute 09/16/2019 0.2  0.0 - 0.2 K/uL Final    Total CK 09/16/2019 63  0 - 190 U/L Final    Sodium 09/16/2019 135  135 - 144 mEq/L Final    Potassium 09/16/2019 3.5  3.4 - 4.9 mEq/L Final    Chloride 09/16/2019 96  95 - 107 mEq/L Final    CO2 09/16/2019 26  20 - 31 mEq/L Final    Anion Gap 09/16/2019 13  9 - 15 mEq/L Final    Glucose 09/16/2019 106* 70 - 99 mg/dL Final    BUN 09/16/2019 7  6 - 20 mg/dL Final    CREATININE 09/16/2019 0.75  0.70 - 1.20 mg/dL Final    GFR Non- 09/16/2019 >60.0  >60 Final    Comment: >60 mL/min/1.73m2 EGFR, calc. for ages 25 and older using the  MDRD formula (not corrected for weight), is valid for stable  renal function.       GFR  09/16/2019 >60.0  >60 Final    Comment: >60 mL/min/1.73m2 EGFR, calc. for ages discharge papers, discussing discharge with patient, medication review, etc.    Signed:  Courtenay Lefort  9/24/2019  11:49 PM

## 2025-05-02 ENCOUNTER — TELEMEDICINE (OUTPATIENT)
Dept: URGENT CARE | Age: 60
End: 2025-05-02
Payer: MEDICARE

## 2025-05-02 DIAGNOSIS — G89.29 CHRONIC LEFT-SIDED LOW BACK PAIN WITH SCIATICA, SCIATICA LATERALITY UNSPECIFIED: Primary | ICD-10-CM

## 2025-05-02 DIAGNOSIS — M54.40 CHRONIC LEFT-SIDED LOW BACK PAIN WITH SCIATICA, SCIATICA LATERALITY UNSPECIFIED: Primary | ICD-10-CM

## 2025-05-02 NOTE — PROGRESS NOTES
Patient  presented virtually requesting narcotics for chronic back pain since an injury several years back.  Patient is following with pain management who did not prescribe narcotics.  Reports was prescribed narcotics in the ED over the past couple of weeks and PCP would not refill the narcotics.  Discussed with patient for back injury patient should be examined in clinic and generally speaking we do not prescribe narcotics via virtual visits and will not prescribe narcotics if PCP declined to refill narcotics.   Visit will be canceled and patient is advised to follow-up with pain management to discuss potential treatment with narcotics going forward  if appropriate for patient.  NO CHARGE VISIT